# Patient Record
Sex: MALE | Race: WHITE | NOT HISPANIC OR LATINO | Employment: OTHER | URBAN - METROPOLITAN AREA
[De-identification: names, ages, dates, MRNs, and addresses within clinical notes are randomized per-mention and may not be internally consistent; named-entity substitution may affect disease eponyms.]

---

## 2023-01-07 ENCOUNTER — APPOINTMENT (OUTPATIENT)
Dept: CT IMAGING | Facility: HOSPITAL | Age: 68
End: 2023-01-07
Attending: EMERGENCY MEDICINE
Payer: MEDICARE

## 2023-01-07 ENCOUNTER — APPOINTMENT (OUTPATIENT)
Dept: RADIOLOGY | Facility: HOSPITAL | Age: 68
End: 2023-01-07
Attending: EMERGENCY MEDICINE
Payer: MEDICARE

## 2023-01-07 ENCOUNTER — HOSPITAL ENCOUNTER (EMERGENCY)
Facility: HOSPITAL | Age: 68
Discharge: HOME | End: 2023-01-07
Attending: EMERGENCY MEDICINE | Admitting: EMERGENCY MEDICINE
Payer: MEDICARE

## 2023-01-07 VITALS
HEIGHT: 66 IN | TEMPERATURE: 97 F | DIASTOLIC BLOOD PRESSURE: 64 MMHG | RESPIRATION RATE: 18 BRPM | SYSTOLIC BLOOD PRESSURE: 120 MMHG | HEART RATE: 71 BPM | BODY MASS INDEX: 28.12 KG/M2 | OXYGEN SATURATION: 97 % | WEIGHT: 175 LBS

## 2023-01-07 DIAGNOSIS — R11.10 VOMITING, UNSPECIFIED VOMITING TYPE, UNSPECIFIED WHETHER NAUSEA PRESENT: ICD-10-CM

## 2023-01-07 DIAGNOSIS — E86.0 DEHYDRATION: ICD-10-CM

## 2023-01-07 DIAGNOSIS — E83.42 HYPOMAGNESEMIA: ICD-10-CM

## 2023-01-07 DIAGNOSIS — K29.00 ACUTE GASTRITIS WITHOUT HEMORRHAGE, UNSPECIFIED GASTRITIS TYPE: ICD-10-CM

## 2023-01-07 LAB
ALBUMIN SERPL BCG-MCNC: 4.5 G/DL (ref 3.5–5.2)
ALBUMIN UR-MCNC: NEGATIVE MG/DL
ALP SERPL-CCNC: 75 U/L (ref 40–129)
ALT SERPL W P-5'-P-CCNC: 32 U/L (ref 10–50)
ANION GAP SERPL CALCULATED.3IONS-SCNC: 13 MMOL/L (ref 7–15)
APPEARANCE UR: CLEAR
APTT PPP: 37 SECONDS (ref 22–38)
AST SERPL W P-5'-P-CCNC: 30 U/L (ref 10–50)
BACTERIA #/AREA URNS HPF: ABNORMAL /HPF
BASOPHILS # BLD AUTO: 0.1 10E3/UL (ref 0–0.2)
BASOPHILS NFR BLD AUTO: 1 %
BILIRUB SERPL-MCNC: 0.5 MG/DL
BILIRUB UR QL STRIP: NEGATIVE
BUN SERPL-MCNC: 31.6 MG/DL (ref 8–23)
CALCIUM SERPL-MCNC: 9.5 MG/DL (ref 8.8–10.2)
CHLORIDE SERPL-SCNC: 100 MMOL/L (ref 98–107)
COLOR UR AUTO: ABNORMAL
CREAT SERPL-MCNC: 1.48 MG/DL (ref 0.67–1.17)
DEPRECATED HCO3 PLAS-SCNC: 27 MMOL/L (ref 22–29)
EOSINOPHIL # BLD AUTO: 0.2 10E3/UL (ref 0–0.7)
EOSINOPHIL NFR BLD AUTO: 2 %
ERYTHROCYTE [DISTWIDTH] IN BLOOD BY AUTOMATED COUNT: 13.6 % (ref 10–15)
GFR SERPL CREATININE-BSD FRML MDRD: 52 ML/MIN/1.73M2
GLUCOSE SERPL-MCNC: 134 MG/DL (ref 70–99)
GLUCOSE UR STRIP-MCNC: NEGATIVE MG/DL
HCT VFR BLD AUTO: 37.1 % (ref 40–53)
HGB BLD-MCNC: 12.4 G/DL (ref 13.3–17.7)
HGB UR QL STRIP: NEGATIVE
HYALINE CASTS: 1 /LPF
IMM GRANULOCYTES # BLD: 0.1 10E3/UL
IMM GRANULOCYTES NFR BLD: 1 %
INR PPP: 1.82 (ref 0.85–1.15)
KETONES UR STRIP-MCNC: ABNORMAL MG/DL
LACTATE SERPL-SCNC: 1.3 MMOL/L (ref 0.7–2)
LACTATE SERPL-SCNC: 2.4 MMOL/L (ref 0.7–2)
LEUKOCYTE ESTERASE UR QL STRIP: ABNORMAL
LIPASE SERPL-CCNC: 85 U/L (ref 13–60)
LYMPHOCYTES # BLD AUTO: 2.3 10E3/UL (ref 0.8–5.3)
LYMPHOCYTES NFR BLD AUTO: 21 %
MAGNESIUM SERPL-MCNC: 1.4 MG/DL (ref 1.7–2.3)
MCH RBC QN AUTO: 29.6 PG (ref 26.5–33)
MCHC RBC AUTO-ENTMCNC: 33.4 G/DL (ref 31.5–36.5)
MCV RBC AUTO: 89 FL (ref 78–100)
MONOCYTES # BLD AUTO: 0.8 10E3/UL (ref 0–1.3)
MONOCYTES NFR BLD AUTO: 7 %
MUCOUS THREADS #/AREA URNS LPF: PRESENT /LPF
NEUTROPHILS # BLD AUTO: 7.7 10E3/UL (ref 1.6–8.3)
NEUTROPHILS NFR BLD AUTO: 68 %
NITRATE UR QL: NEGATIVE
NRBC # BLD AUTO: 0 10E3/UL
NRBC BLD AUTO-RTO: 0 /100
PH UR STRIP: 5.5 [PH] (ref 5–7)
PLATELET # BLD AUTO: 269 10E3/UL (ref 150–450)
POTASSIUM SERPL-SCNC: 3.9 MMOL/L (ref 3.4–5.3)
PROT SERPL-MCNC: 7.3 G/DL (ref 6.4–8.3)
RBC # BLD AUTO: 4.19 10E6/UL (ref 4.4–5.9)
RBC URINE: 2 /HPF
SODIUM SERPL-SCNC: 140 MMOL/L (ref 136–145)
SP GR UR STRIP: >1.05 (ref 1–1.03)
SQUAMOUS EPITHELIAL: <1 /HPF
TROPONIN T SERPL HS-MCNC: 23 NG/L
TROPONIN T SERPL HS-MCNC: 27 NG/L
UROBILINOGEN UR STRIP-MCNC: <2 MG/DL
WBC # BLD AUTO: 11.1 10E3/UL (ref 4–11)
WBC URINE: 4 /HPF

## 2023-01-07 PROCEDURE — 250N000013 HC RX MED GY IP 250 OP 250 PS 637: Performed by: EMERGENCY MEDICINE

## 2023-01-07 PROCEDURE — 70450 CT HEAD/BRAIN W/O DYE: CPT

## 2023-01-07 PROCEDURE — 85730 THROMBOPLASTIN TIME PARTIAL: CPT | Performed by: EMERGENCY MEDICINE

## 2023-01-07 PROCEDURE — 99285 EMERGENCY DEPT VISIT HI MDM: CPT | Mod: 25

## 2023-01-07 PROCEDURE — 93005 ELECTROCARDIOGRAM TRACING: CPT | Performed by: EMERGENCY MEDICINE

## 2023-01-07 PROCEDURE — 258N000003 HC RX IP 258 OP 636: Performed by: EMERGENCY MEDICINE

## 2023-01-07 PROCEDURE — 85025 COMPLETE CBC W/AUTO DIFF WBC: CPT | Performed by: EMERGENCY MEDICINE

## 2023-01-07 PROCEDURE — 71046 X-RAY EXAM CHEST 2 VIEWS: CPT

## 2023-01-07 PROCEDURE — 84484 ASSAY OF TROPONIN QUANT: CPT | Mod: 91 | Performed by: EMERGENCY MEDICINE

## 2023-01-07 PROCEDURE — 74177 CT ABD & PELVIS W/CONTRAST: CPT

## 2023-01-07 PROCEDURE — 250N000011 HC RX IP 250 OP 636: Performed by: EMERGENCY MEDICINE

## 2023-01-07 PROCEDURE — 36415 COLL VENOUS BLD VENIPUNCTURE: CPT | Performed by: EMERGENCY MEDICINE

## 2023-01-07 PROCEDURE — 80053 COMPREHEN METABOLIC PANEL: CPT | Performed by: EMERGENCY MEDICINE

## 2023-01-07 PROCEDURE — 83735 ASSAY OF MAGNESIUM: CPT | Performed by: EMERGENCY MEDICINE

## 2023-01-07 PROCEDURE — 83690 ASSAY OF LIPASE: CPT | Performed by: EMERGENCY MEDICINE

## 2023-01-07 PROCEDURE — 96360 HYDRATION IV INFUSION INIT: CPT | Mod: 59

## 2023-01-07 PROCEDURE — 84484 ASSAY OF TROPONIN QUANT: CPT | Performed by: EMERGENCY MEDICINE

## 2023-01-07 PROCEDURE — 85610 PROTHROMBIN TIME: CPT | Performed by: EMERGENCY MEDICINE

## 2023-01-07 PROCEDURE — 81001 URINALYSIS AUTO W/SCOPE: CPT | Performed by: EMERGENCY MEDICINE

## 2023-01-07 PROCEDURE — 83605 ASSAY OF LACTIC ACID: CPT | Performed by: EMERGENCY MEDICINE

## 2023-01-07 RX ORDER — IOPAMIDOL 755 MG/ML
80 INJECTION, SOLUTION INTRAVASCULAR ONCE
Status: COMPLETED | OUTPATIENT
Start: 2023-01-07 | End: 2023-01-07

## 2023-01-07 RX ORDER — MAGNESIUM OXIDE 400 MG/1
400 TABLET ORAL ONCE
Status: COMPLETED | OUTPATIENT
Start: 2023-01-07 | End: 2023-01-07

## 2023-01-07 RX ADMIN — IOPAMIDOL 75 ML: 755 INJECTION, SOLUTION INTRAVENOUS at 20:17

## 2023-01-07 RX ADMIN — SODIUM CHLORIDE 500 ML: 9 INJECTION, SOLUTION INTRAVENOUS at 19:23

## 2023-01-07 RX ADMIN — SODIUM CHLORIDE 500 ML: 9 INJECTION, SOLUTION INTRAVENOUS at 20:49

## 2023-01-07 RX ADMIN — Medication 400 MG: at 22:17

## 2023-01-07 ASSESSMENT — ACTIVITIES OF DAILY LIVING (ADL)
ADLS_ACUITY_SCORE: 33
ADLS_ACUITY_SCORE: 35
ADLS_ACUITY_SCORE: 35

## 2023-01-08 NOTE — ED PROVIDER NOTES
EMERGENCY DEPARTMENT ENCOUNTER      NAME: Deshaun Mccormick  AGE: 67 year old male  YOB: 1955  MRN: [id number]  EVALUATION DATE & TIME: 1/7/2023  6:52 PM    PCP: No primary care provider on file.    ED PROVIDER: Shyann Hein M.D.      Chief Complaint   Patient presents with     Nausea & Vomiting     FINAL IMPRESSION:  1. Vomiting, unspecified vomiting type, unspecified whether nausea present    2. Hypomagnesemia    3. Acute gastritis without hemorrhage, unspecified gastritis type      ED COURSE & MEDICAL DECISION MAKING:    Pertinent Labs & Imaging studies reviewed. (See chart for details)  ED Course as of 01/07/23 2250   Sat Jan 07, 2023   1905 Patient presents with an episode of vomiting daily ever since Sunday.  He really does not have any associated symptoms.  There is no abdominal pain with it making obstruction or perforation or cholecystitis or pancreatitis much less likely.  We will check basic labs including CMP and a lipase and get imaging of his abdomen to further evaluate.  There is no headache with it making an intracranial mass or bleed less likely although the patient is on blood thinners so we will get a CT scan of his head.  He has had a little bit of coughing although it sounds like it is most likely related to the vomiting and some gagging but we will get a chest x-ray to make sure we do not see any signs of a pneumonia.  He does not have any crackles or shortness of breath and his vital signs do not suggest sepsis at this time.  This could be something more benign such as a gastritis or gastroenteritis and as long as more concerning etiologies are evaluated for I do not think we have to make that diagnosis today.  It does not sound like the patient is having a lot of nausea.  He may have a little bit right before he vomits but its not persistent so he does not need any nausea medication right now.  He does not need any pain medications as he is not having any pain.  Gastroparesis  is on the differential although he is not had a history of that before.  His exam was pretty unremarkable other than the old weakness in his left arm, left leg, left face, and right ptosis.  I discussed the plan with the patient and family and they are in agreement.  We will give him some IV fluids in case there is some dehydration.  Will check for electrolyte abnormalities that would be caused by the vomiting or contributing to the vomiting.  We will get an EKG and a troponin to make sure this is not an atypical presentation of ACS   1956 Patient's lactate is little elevated at 2.4.  His magnesium is low at 1.4.  Troponin is 27 which will need to be rechecked and his lipase is 85.  Creatinine is 1.48 and I do not have a baseline to compare to.  His hemoglobin and white count look okay.  We will get a repeat lactate after the fluids.  Hopefully patient can go down for imaging shortly.   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   2121 Patient back and saw the patient.  He is doing okay.  We talked about some of the results and need for repeat draws.  I will give him some oral magnesium here.  We discussed the stenosis in the SMA but I do not think it is contributing to his symptoms.  He is not having pain after eating.  He is having vomiting and it seems more like random times.  I think he can follow-up with his primary care doctor next week.  They are okay with that.  We can get the redraws and the lactate and the troponin now.  We will also get the urine shortly.   2205 Patient's urine shows some leukocyte esterase but only few bacteria and only 4 white cells and 2 red cells which is not clearly infected.  Lactic acid has improved significantly and is at 1.3.  I suspect there was some dehydration component to his elevated lactic acid and after getting the fluids it has improved.  Repeat troponin is  still pending.   2238 Repeat troponin is unchanged.  I will work on getting the patient discharged home.   2247 I discussed the results and plan for discharge with the patient.  He is in agreement with the plan.  He was dismissed in stable condition.       Medical Decision Making    History:    Supplemental history from: Family Member/Significant Other    External Record(s) reviewed: Documented in HPI, if applicable.    Work Up:    Chart documentation includes differential considered and any EKGs or imaging independently interpreted by provider.    In additional to work up documented, I considered the following work up: See chart documentation, if applicable.    External consultation:    Discussion of management with another provider: See chart documentation, if applicable    Complicating factors:    Care impacted by chronic illness: Cerebrovascular Disease    Care affected by social determinants of health: N/A    Disposition considerations: Discharge. I recommended the patient continue their current prescription strength medication(s): Pantoprazole. I considered admission, but ultimately discharged patient After work-up did not reveal a more emergent cause of his symptoms that required hospitalization..    7:02 PM I met the patient and performed my initial interview and exam.   9:14 PM I rechecked and updated the patient.      At the conclusion of the encounter I discussed  the results of all of the tests and the disposition with patient.   All questions were answered.  The patient acknowledged understanding and was involved in the decision making regarding the overall care plan.      I discussed with patient the utility, limitations and findings of the exam/interventions/studies done during this visit as well as the list of differential diagnosis and symptoms to monitor/return to ER for.  Additional verbal discharge instructions were provided.       MEDICATIONS GIVEN IN THE EMERGENCY:  Medications   magnesium  oxide (MAG-OX) tablet 400 mg (has no administration in time range)   0.9% sodium chloride BOLUS (0 mLs Intravenous Stopped 1/7/23 2048)   iopamidol (ISOVUE-370) solution 80 mL (75 mLs Intravenous Given 1/7/23 2017)   0.9% sodium chloride BOLUS (0 mLs Intravenous Stopped 1/7/23 2130)     NEW PRESCRIPTIONS STARTED AT TODAY'S ER VISIT  New Prescriptions    No medications on file      =================================================================    HPI    Triage Note: Patient arrives with complaints of intermittent nausea and vomiting that started Sunday. No abdominal pain, no fever or chills. Denies urinary symptoms. Normal, soft formed stools. Hx of hernia surgery in 1991 and stroke with left side deficits.     Patient information was obtained from: Wife/Patient    Use of : N/A       Deshaun Mccormick is a 67 year old male who presents with vomiting.     Patient presents with vomiting. Wife reports that he will vomit about once a day for the past six days at varying times. Wife mentioned that the only day he did not vomit was three days ago, however, he did vomit around midnight two days ago, and one hour after breakfast and during the afternoon yesterday, and also once this afternoon. Wife reports patient will cough with vomiting, but his coughing is not causing his vomiting episodes. Wife says his bowel movements are soft, but not quite diarrhea. Wife mentions that he is on Eliquis, and has a history of atrial fibrillation and also has left sided weakness due to previous stroke. Patient has trouble opening his right eye chronically, but he can still see out of it. Wife reports that blood pressure, heart rate, and glucose were stable throughout the past day or two. No history of heart attack. Denies headache, fever, chills, dietary changes, abdominal pain, blood in the stool, or vomit. Denies use of alcohol. No further concerns at this time.       PAST MEDICAL HISTORY:  History reviewed. No pertinent past  "medical history.    PAST SURGICAL HISTORY:  History reviewed. No pertinent surgical history.    CURRENT MEDICATIONS:      Current Facility-Administered Medications:      magnesium oxide (MAG-OX) tablet 400 mg, 400 mg, Oral, Once, Shyann Hein MD  No current outpatient medications on file.    ALLERGIES:  No Known Allergies    FAMILY HISTORY:  History reviewed. No pertinent family history.    SOCIAL HISTORY:        PHYSICAL EXAM    VITAL SIGNS: /64   Pulse 66   Temp 97  F (36.1  C) (Temporal)   Resp 18   Ht 1.676 m (5' 6\")   Wt 79.4 kg (175 lb)   SpO2 97%   BMI 28.25 kg/m     GENERAL: Patient has drooping of his right eyelid and drooping of his left face at baseline.  Minimal movement of left arm and left leg at baseline.  He is wearing a brace on his left ankle.  He needs significant assistance to get into the bed.  Patient does not appear toxic.  He makes good eye contact.  He is able to be engaged in the conversation  SPEECH: Slightly slowed speech but still able to understand  PULMONARY: No respiratory distress, Lungs clear to auscultation bilaterally  CARDIOVASCULAR: Regular rate and rhythm, Distal pulses present and normal.  ABDOMINAL: Soft, Nondistended, Nontender, No rebound or guarding, No palpable masses  EXTREMITIES: Weakness of the left upper and lower extremity  PSYCH: Normal mood and affect     LAB:  All pertinent labs reviewed and interpreted.  Results for orders placed or performed during the hospital encounter of 01/07/23   Head CT w/o contrast    Impression    IMPRESSION:  1.  No CT evidence for acute intracranial process.  2.  Chronic large right cerebral encephalomalacia related to prior infarcts.   CT Abdomen Pelvis w Contrast    Impression    IMPRESSION:   1.  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 No bowel wall thickening.    2.  No appendicitis.   Chest XR,  PA & LAT    Impression    IMPRESSION: Negative chest.   Result Value Ref Range    " INR 1.82 (H) 0.85 - 1.15   Partial thromboplastin time   Result Value Ref Range    aPTT 37 22 - 38 Seconds   Comprehensive metabolic panel   Result Value Ref Range    Sodium 140 136 - 145 mmol/L    Potassium 3.9 3.4 - 5.3 mmol/L    Chloride 100 98 - 107 mmol/L    Carbon Dioxide (CO2) 27 22 - 29 mmol/L    Anion Gap 13 7 - 15 mmol/L    Urea Nitrogen 31.6 (H) 8.0 - 23.0 mg/dL    Creatinine 1.48 (H) 0.67 - 1.17 mg/dL    Calcium 9.5 8.8 - 10.2 mg/dL    Glucose 134 (H) 70 - 99 mg/dL    Alkaline Phosphatase 75 40 - 129 U/L    AST 30 10 - 50 U/L    ALT 32 10 - 50 U/L    Protein Total 7.3 6.4 - 8.3 g/dL    Albumin 4.5 3.5 - 5.2 g/dL    Bilirubin Total 0.5 <=1.2 mg/dL    GFR Estimate 52 (L) >60 mL/min/1.73m2   Lactic acid whole blood   Result Value Ref Range    Lactic Acid 2.4 (H) 0.7 - 2.0 mmol/L   Result Value Ref Range    Troponin T, High Sensitivity 27 (H) <=22 ng/L   Result Value Ref Range    Magnesium 1.4 (L) 1.7 - 2.3 mg/dL   Result Value Ref Range    Lipase 85 (H) 13 - 60 U/L   UA with Microscopic reflex to Culture    Specimen: Urine, Midstream   Result Value Ref Range    Color Urine Light Yellow Colorless, Straw, Light Yellow, Yellow    Appearance Urine Clear Clear    Glucose Urine Negative Negative mg/dL    Bilirubin Urine Negative Negative    Ketones Urine Trace (A) Negative mg/dL    Specific Gravity Urine >1.050 (H) 1.001 - 1.030    Blood Urine Negative Negative    pH Urine 5.5 5.0 - 7.0    Protein Albumin Urine Negative Negative mg/dL    Urobilinogen Urine <2.0 <2.0 mg/dL    Nitrite Urine Negative Negative    Leukocyte Esterase Urine 75 Arnold/uL (A) Negative    Bacteria Urine Few (A) None Seen /HPF    Mucus Urine Present (A) None Seen /LPF    RBC Urine 2 <=2 /HPF    WBC Urine 4 <=5 /HPF    Squamous Epithelials Urine <1 <=1 /HPF    Hyaline Casts Urine 1 <=2 /LPF   CBC with platelets and differential   Result Value Ref Range    WBC Count 11.1 (H) 4.0 - 11.0 10e3/uL    RBC Count 4.19 (L) 4.40 - 5.90 10e6/uL     Hemoglobin 12.4 (L) 13.3 - 17.7 g/dL    Hematocrit 37.1 (L) 40.0 - 53.0 %    MCV 89 78 - 100 fL    MCH 29.6 26.5 - 33.0 pg    MCHC 33.4 31.5 - 36.5 g/dL    RDW 13.6 10.0 - 15.0 %    Platelet Count 269 150 - 450 10e3/uL    % Neutrophils 68 %    % Lymphocytes 21 %    % Monocytes 7 %    % Eosinophils 2 %    % Basophils 1 %    % Immature Granulocytes 1 %    NRBCs per 100 WBC 0 <1 /100    Absolute Neutrophils 7.7 1.6 - 8.3 10e3/uL    Absolute Lymphocytes 2.3 0.8 - 5.3 10e3/uL    Absolute Monocytes 0.8 0.0 - 1.3 10e3/uL    Absolute Eosinophils 0.2 0.0 - 0.7 10e3/uL    Absolute Basophils 0.1 0.0 - 0.2 10e3/uL    Absolute Immature Granulocytes 0.1 <=0.4 10e3/uL    Absolute NRBCs 0.0 10e3/uL   Lactic acid whole blood   Result Value Ref Range    Lactic Acid 1.3 0.7 - 2.0 mmol/L       RADIOLOGY:  CT Abdomen Pelvis w Contrast   Final Result   IMPRESSION:    1.  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 No bowel wall thickening.      2.  No appendicitis.      Head CT w/o contrast   Final Result   IMPRESSION:   1.  No CT evidence for acute intracranial process.   2.  Chronic large right cerebral encephalomalacia related to prior infarcts.      Chest XR,  PA & LAT   Final Result   IMPRESSION: Negative chest.              EKG:    Date and time: January 7, 2023 at 1939  Rate: 57 bpm  Rhythm: Sinus bradycardia  CA interval: 148 ms  QRS interval: 104 ms  QT/QTc: 494/480 ms  ST changes or T wave changes: Nonspecific ST or T wave abnormality  Change from prior ECG: No prior to compare to  I have independently reviewed and interpreted this EKG.     I, Jerry Cain, am serving as a scribe to document services personally performed by Dr. Hein based on my observation and the provider's statements to me. I, Shyann Hein MD attest that Jerry Cain is acting in a scribe capacity, has observed my performance of the services and has documented them in accordance with my direction.    Shyann Hein  M.D.  Emergency Medicine  [facility] EMERGENCY DEPARTMENT  [facility] [facility] [id number] [phone]  Dept: [phone]     Shyann Hein MD  01/07/23 9310

## 2023-01-08 NOTE — DISCHARGE INSTRUCTIONS
You were seen in the Emergency Department today for evaluation of some episodes of vomiting over the last week.  Your lab work showed no cause of your symptoms. Your imaging studies showed some mild to moderate narrowing of the superior mesenteric artery.  Your symptoms do not seem consistent with this being the etiology.  Continued to take your pantoprazole.  Follow up with your primary care physician to ensure resolution of symptoms. Return if you have new or worsening symptoms.

## 2023-01-08 NOTE — ED TRIAGE NOTES
Patient arrives with complaints of intermittent nausea and vomiting that started Sunday. No abdominal pain, no fever or chills. Denies urinary symptoms. Normal, soft formed stools. Hx of hernia surgery in 1991 and stroke with left side deficits.